# Patient Record
Sex: FEMALE | Race: WHITE | NOT HISPANIC OR LATINO | URBAN - METROPOLITAN AREA
[De-identification: names, ages, dates, MRNs, and addresses within clinical notes are randomized per-mention and may not be internally consistent; named-entity substitution may affect disease eponyms.]

---

## 2020-07-01 ENCOUNTER — APPOINTMENT (RX ONLY)
Dept: RURAL CLINIC 4 | Facility: CLINIC | Age: 20
Setting detail: DERMATOLOGY
End: 2020-07-01

## 2020-07-01 DIAGNOSIS — L90.8 OTHER ATROPHIC DISORDERS OF SKIN: ICD-10-CM

## 2020-07-01 DIAGNOSIS — L57.8 OTHER SKIN CHANGES DUE TO CHRONIC EXPOSURE TO NONIONIZING RADIATION: ICD-10-CM

## 2020-07-01 DIAGNOSIS — D22 MELANOCYTIC NEVI: ICD-10-CM

## 2020-07-01 DIAGNOSIS — L70.0 ACNE VULGARIS: ICD-10-CM

## 2020-07-01 PROBLEM — D22.5 MELANOCYTIC NEVI OF TRUNK: Status: ACTIVE | Noted: 2020-07-01

## 2020-07-01 PROCEDURE — ? PRESCRIPTION

## 2020-07-01 PROCEDURE — 99203 OFFICE O/P NEW LOW 30 MIN: CPT

## 2020-07-01 PROCEDURE — ? COUNSELING

## 2020-07-01 PROCEDURE — ? OBSERVATION

## 2020-07-01 RX ORDER — CLINDAMYCIN PHOSPHATE 10 MG/ML
1 SOLUTION TOPICAL
Qty: 1 | Refills: 3 | Status: ERX | COMMUNITY
Start: 2020-07-01

## 2020-07-01 RX ORDER — ADAPALENE 1 MG/G
THIN LAYER CREAM TOPICAL
Qty: 1 | Refills: 2 | Status: ERX | COMMUNITY
Start: 2020-07-01

## 2020-07-01 RX ADMIN — CLINDAMYCIN PHOSPHATE 1: 10 SOLUTION TOPICAL at 00:00

## 2020-07-01 RX ADMIN — ADAPALENE THIN LAYER: 1 CREAM TOPICAL at 00:00

## 2020-07-01 ASSESSMENT — LOCATION ZONE DERM
LOCATION ZONE: LEG
LOCATION ZONE: NECK
LOCATION ZONE: FACE
LOCATION ZONE: TRUNK

## 2020-07-01 ASSESSMENT — LOCATION DETAILED DESCRIPTION DERM
LOCATION DETAILED: EPIGASTRIC SKIN
LOCATION DETAILED: RIGHT INFERIOR CENTRAL MALAR CHEEK
LOCATION DETAILED: LEFT SUPERIOR ANTERIOR NECK
LOCATION DETAILED: RIGHT ANTERIOR DISTAL THIGH
LOCATION DETAILED: RIGHT INFERIOR ANTERIOR NECK
LOCATION DETAILED: LEFT INFERIOR CENTRAL MALAR CHEEK

## 2020-07-01 ASSESSMENT — LOCATION SIMPLE DESCRIPTION DERM
LOCATION SIMPLE: RIGHT THIGH
LOCATION SIMPLE: RIGHT ANTERIOR NECK
LOCATION SIMPLE: LEFT CHEEK
LOCATION SIMPLE: RIGHT CHEEK
LOCATION SIMPLE: LEFT ANTERIOR NECK
LOCATION SIMPLE: ABDOMEN

## 2020-07-01 NOTE — PROCEDURE: OBSERVATION
Detail Level: Detailed
Size Of Lesion In Cm (Optional): 1.5
X Size Of Lesion In Cm (Optional): 0
Size Of Lesion In Cm (Optional): 1.3

## 2020-07-01 NOTE — PROCEDURE: MIPS QUALITY
Additional Notes: Patient not of age for pneumonia vaccine
Quality 111:Pneumonia Vaccination Status For Older Adults: Pneumococcal Vaccination not Administered or Previously Received, Reason not Otherwise Specified
Quality 130: Documentation Of Current Medications In The Medical Record: Current Medications Documented
Detail Level: Detailed

## 2020-07-10 ENCOUNTER — NEW PATIENT COMPREHENSIVE (OUTPATIENT)
Dept: URBAN - METROPOLITAN AREA CLINIC 43 | Facility: CLINIC | Age: 20
End: 2020-07-10

## 2020-07-10 DIAGNOSIS — H40.013: ICD-10-CM

## 2020-07-10 DIAGNOSIS — H04.123: ICD-10-CM

## 2020-07-10 PROCEDURE — 92004 COMPRE OPH EXAM NEW PT 1/>: CPT

## 2020-07-10 PROCEDURE — 92015 DETERMINE REFRACTIVE STATE: CPT

## 2020-07-10 ASSESSMENT — VISUAL ACUITY
OD_CC: 20/20-1
OS_CC: 20/20
OS_SC: 20/30
OS_SC: J1
OD_SC: 20/30-2
OD_SC: J1-

## 2020-07-10 ASSESSMENT — TONOMETRY
OS_IOP_MMHG: 13
OD_IOP_MMHG: 13

## 2021-11-24 ENCOUNTER — APPOINTMENT (RX ONLY)
Dept: RURAL CLINIC 4 | Facility: CLINIC | Age: 21
Setting detail: DERMATOLOGY
End: 2021-11-24

## 2021-11-24 DIAGNOSIS — L30.9 DERMATITIS, UNSPECIFIED: ICD-10-CM

## 2021-11-24 PROCEDURE — ? PRESCRIPTION MEDICATION MANAGEMENT

## 2021-11-24 PROCEDURE — ? COUNSELING

## 2021-11-24 PROCEDURE — 99213 OFFICE O/P EST LOW 20 MIN: CPT

## 2021-11-24 ASSESSMENT — LOCATION SIMPLE DESCRIPTION DERM
LOCATION SIMPLE: RIGHT FOREARM
LOCATION SIMPLE: LEFT FOREARM

## 2021-11-24 ASSESSMENT — LOCATION ZONE DERM: LOCATION ZONE: ARM

## 2021-11-24 ASSESSMENT — LOCATION DETAILED DESCRIPTION DERM
LOCATION DETAILED: RIGHT VENTRAL PROXIMAL FOREARM
LOCATION DETAILED: LEFT VENTRAL PROXIMAL FOREARM

## 2021-11-24 ASSESSMENT — BSA RASH: BSA RASH: 3

## 2021-11-24 NOTE — PROCEDURE: PRESCRIPTION MEDICATION MANAGEMENT
Render In Strict Bullet Format?: No
Detail Level: Zone
Plan: Pt to call office to let us know if worsens, as we may consider sending in topical steroid.
Continue Regimen: Econazole cream to arms bid until resolved. Pt has cream at home from urgent care.

## 2021-12-21 ENCOUNTER — COMPREHENSIVE EXAM (OUTPATIENT)
Dept: URBAN - METROPOLITAN AREA CLINIC 43 | Facility: CLINIC | Age: 21
End: 2021-12-21

## 2021-12-21 DIAGNOSIS — H40.013: ICD-10-CM

## 2021-12-21 DIAGNOSIS — H04.123: ICD-10-CM

## 2021-12-21 DIAGNOSIS — H52.03: ICD-10-CM

## 2021-12-21 PROCEDURE — 92015 DETERMINE REFRACTIVE STATE: CPT

## 2021-12-21 PROCEDURE — 92014 COMPRE OPH EXAM EST PT 1/>: CPT

## 2021-12-21 ASSESSMENT — VISUAL ACUITY
OS_SC: J1
OD_SC: 20/30
OS_SC: 20/25-1
OD_SC: J1
OS_CC: 20/20
OD_CC: 20/20

## 2021-12-21 ASSESSMENT — TONOMETRY
OS_IOP_MMHG: 13
OD_IOP_MMHG: 14

## 2024-02-05 ENCOUNTER — COMPREHENSIVE EXAM (OUTPATIENT)
Dept: URBAN - METROPOLITAN AREA CLINIC 43 | Facility: CLINIC | Age: 24
End: 2024-02-05

## 2024-02-05 DIAGNOSIS — H52.03: ICD-10-CM

## 2024-02-05 PROCEDURE — 92014 COMPRE OPH EXAM EST PT 1/>: CPT

## 2024-02-05 PROCEDURE — 92015 DETERMINE REFRACTIVE STATE: CPT

## 2024-02-05 ASSESSMENT — TONOMETRY
OS_IOP_MMHG: 14
OD_IOP_MMHG: 13

## 2024-02-05 ASSESSMENT — VISUAL ACUITY
OD_SC: J2
OS_SC: J1
OD_SC: 20/25
OD_CC: 20/25
OS_CC: 20/30
OS_SC: 20/25